# Patient Record
Sex: FEMALE | Race: WHITE | Employment: UNEMPLOYED
[De-identification: names, ages, dates, MRNs, and addresses within clinical notes are randomized per-mention and may not be internally consistent; named-entity substitution may affect disease eponyms.]

---

## 2021-07-16 ENCOUNTER — HOSPITAL ENCOUNTER (OUTPATIENT)
Facility: MEDICAL CENTER | Age: 21
End: 2021-07-16

## 2021-07-19 ENCOUNTER — HOSPITAL ENCOUNTER (OUTPATIENT)
Age: 21
Setting detail: SPECIMEN
Discharge: HOME | End: 2021-07-19
Payer: MEDICAID

## 2021-07-19 ENCOUNTER — OFFICE VISIT (OUTPATIENT)
Dept: OBGYN CLINIC | Age: 21
End: 2021-07-19
Payer: MEDICAID

## 2021-07-19 VITALS
BODY MASS INDEX: 49.78 KG/M2 | HEART RATE: 106 BPM | HEIGHT: 62 IN | WEIGHT: 270.5 LBS | SYSTOLIC BLOOD PRESSURE: 115 MMHG | DIASTOLIC BLOOD PRESSURE: 73 MMHG

## 2021-07-19 DIAGNOSIS — N93.9 ABNORMAL UTERINE BLEEDING (AUB): Primary | ICD-10-CM

## 2021-07-19 DIAGNOSIS — N93.9 ABNORMAL UTERINE BLEEDING (AUB): ICD-10-CM

## 2021-07-19 DIAGNOSIS — N92.1 MENORRHAGIA WITH IRREGULAR CYCLE: ICD-10-CM

## 2021-07-19 LAB
ABSOLUTE EOS #: 0.32 K/UL (ref 0–0.44)
ABSOLUTE IMMATURE GRANULOCYTE: 0.11 K/UL (ref 0–0.3)
ABSOLUTE LYMPH #: 2.05 K/UL (ref 1.2–5.2)
ABSOLUTE MONO #: 1.4 K/UL (ref 0.1–1.4)
BASOPHILS # BLD: 1 % (ref 0–2)
BASOPHILS ABSOLUTE: 0.11 K/UL (ref 0–0.2)
CHOLESTEROL/HDL RATIO: 2.5
CHOLESTEROL: 114 MG/DL
DIFFERENTIAL TYPE: ABNORMAL
EOSINOPHILS RELATIVE PERCENT: 3 % (ref 1–4)
ESTIMATED AVERAGE GLUCOSE: 68 MG/DL
ESTRADIOL LEVEL: 171 PG/ML (ref 27–314)
FOLLICLE STIMULATING HORMONE: 4.1 U/L (ref 1.7–21.5)
HBA1C MFR BLD: 4 % (ref 4–6)
HCT VFR BLD CALC: 39.3 % (ref 36.3–47.1)
HDLC SERPL-MCNC: 46 MG/DL
HEMOGLOBIN: 10.8 G/DL (ref 11.9–15.1)
IMMATURE GRANULOCYTES: 1 %
LDL CHOLESTEROL: 56 MG/DL (ref 0–130)
LYMPHOCYTES # BLD: 19 % (ref 25–45)
MCH RBC QN AUTO: 21.9 PG (ref 25.2–33.5)
MCHC RBC AUTO-ENTMCNC: 27.5 G/DL (ref 28.4–34.8)
MCV RBC AUTO: 79.7 FL (ref 82.6–102.9)
MONOCYTES # BLD: 13 % (ref 2–8)
MORPHOLOGY: ABNORMAL
NRBC AUTOMATED: 0 PER 100 WBC
PDW BLD-RTO: 21.8 % (ref 11.8–14.4)
PLATELET # BLD: ABNORMAL K/UL (ref 138–453)
PLATELET ESTIMATE: ABNORMAL
PLATELET, FLUORESCENCE: 478 K/UL (ref 138–453)
PLATELET, IMMATURE FRACTION: 3.2 % (ref 1.1–10.3)
PMV BLD AUTO: ABNORMAL FL (ref 8.1–13.5)
PROLACTIN: 24.53 UG/L (ref 4.79–23.3)
RBC # BLD: 4.93 M/UL (ref 3.95–5.11)
RBC # BLD: ABNORMAL 10*6/UL
SEG NEUTROPHILS: 63 % (ref 34–64)
SEGMENTED NEUTROPHILS ABSOLUTE COUNT: 6.81 K/UL (ref 1.8–8)
SEX HORMONE BINDING GLOBULIN: 13 NMOL/L (ref 30–135)
TESTOSTERONE FREE-NONMALE: 6.6 PG/ML (ref 0.8–7.4)
TESTOSTERONE TOTAL: 24 NG/DL (ref 20–70)
TRIGL SERPL-MCNC: 60 MG/DL
TSH SERPL DL<=0.05 MIU/L-ACNC: 0.81 MIU/L (ref 0.3–5)
VLDLC SERPL CALC-MCNC: NORMAL MG/DL (ref 1–30)
WBC # BLD: 10.8 K/UL (ref 4.5–13.5)
WBC # BLD: ABNORMAL 10*3/UL

## 2021-07-19 PROCEDURE — G8417 CALC BMI ABV UP PARAM F/U: HCPCS | Performed by: OBSTETRICS & GYNECOLOGY

## 2021-07-19 PROCEDURE — 99203 OFFICE O/P NEW LOW 30 MIN: CPT | Performed by: OBSTETRICS & GYNECOLOGY

## 2021-07-19 PROCEDURE — G8427 DOCREV CUR MEDS BY ELIG CLIN: HCPCS | Performed by: OBSTETRICS & GYNECOLOGY

## 2021-07-19 PROCEDURE — 1036F TOBACCO NON-USER: CPT | Performed by: OBSTETRICS & GYNECOLOGY

## 2021-07-19 RX ORDER — OMEPRAZOLE 40 MG/1
CAPSULE, DELAYED RELEASE ORAL
COMMUNITY
Start: 2021-07-01

## 2021-07-19 RX ORDER — DROSPIRENONE AND ETHINYL ESTRADIOL 0.03MG-3MG
1 KIT ORAL DAILY
Qty: 1 PACKET | Refills: 3 | Status: SHIPPED | OUTPATIENT
Start: 2021-07-19 | End: 2021-11-09 | Stop reason: SDUPTHER

## 2021-07-19 ASSESSMENT — ENCOUNTER SYMPTOMS
COUGH: 0
SHORTNESS OF BREATH: 0
ABDOMINAL PAIN: 0
BACK PAIN: 0

## 2021-07-19 NOTE — PROGRESS NOTES
[facility]  [facility] - [address] [facility]  Dept: [phone]  Dept Fax: [phone]    22    Chief Complaint   Patient presents with    New Patient    Menorrhagia     Pt states she is having heavy irregular periods        Saleem Rosario 22 y.o. has a complaint of heavy irregular periods, and is here to establish care. Her periods are so heavy that she was given blood in the hospital for her Hb which she says was 4 and that happened last month. She went to [facility] and [facility] where they gave her 3 units. She was having a lot of fatigue and SOB. She says that her Hb was up to 9.5 about 3 weeks ago. She has been taking iron daily for it. She sometimes doesn't have a period every month. She has an appointment with a hematologist next week. She has gone at most about 3-4 months without a period. She had menarche at age 6. She says her periods have never been regular. She is not sexually active and has never been. She says her oldest sister has PCOS. She does have some increased hair under her chin. She does have some acne occasionally. Review of Systems   Constitutional: Negative for chills and fever. HENT: Negative for congestion. Respiratory: Negative for cough and shortness of breath. Cardiovascular: Negative for chest pain and palpitations. Gastrointestinal: Negative for abdominal pain. Genitourinary: Positive for menstrual problem. Negative for dyspareunia, pelvic pain and vaginal discharge. Musculoskeletal: Negative for back pain. Neurological: Negative for dizziness and light-headedness. Psychiatric/Behavioral: The patient is not nervous/anxious. Gynecologic History  Patient's last menstrual period was 2021 (approximate). Contraception: abstinence  Last Pap: n/a  Last Mammogram: n/a    Obstetric History  : 0  Para: 0  AB: 0    History reviewed. No pertinent past medical history.   Past Surgical History:   Procedure Laterality Date    TYMPANOSTOMY TUBE PLACEMENT      WISDOM TOOTH EXTRACTION       No Known Allergies  Current Outpatient Medications   Medication Sig Dispense Refill    omeprazole (PRILOSEC) 40 MG delayed release capsule take 1 capsule by mouth once daily      drospirenone-ethinyl estradiol (RENEE 28) 3-0.03 MG TABS Take 1 tablet by mouth daily 1 packet 3     No current facility-administered medications for this visit. Social History     Socioeconomic History    Marital status: Unknown     Spouse name: Not on file    Number of children: Not on file    Years of education: Not on file    Highest education level: Not on file   Occupational History    Not on file   Tobacco Use    Smoking status: Never Smoker    Smokeless tobacco: Never Used   Vaping Use    Vaping Use: Never used   Substance and Sexual Activity    Alcohol use: Yes     Comment: rare    Drug use: Never    Sexual activity: Not on file   Other Topics Concern    Not on file   Social History Narrative    Not on file     Social Determinants of Health     Financial Resource Strain:     Difficulty of Paying Living Expenses:    Food Insecurity:     Worried About Running Out of Food in the Last Year:     [address] in the Last Year:    Transportation Needs:     Lack of Transportation (Medical):  Lack of Transportation (Non-Medical):    Physical Activity:     Days of Exercise per Week:     Minutes of Exercise per Session:    Stress:     Feeling of Stress :    Social Connections:     Frequency of Communication with Friends and Family:     Frequency of Social Gatherings with Friends and Family:     Attends Mormonism Services:     Active Member of Clubs or Organizations:     Attends Club or Organization Meetings:     Marital Status:    Intimate Partner Violence:     Fear of Current or Ex-Partner:     Emotionally Abused:     Physically Abused:     Sexually Abused:      History reviewed.  No pertinent family history. Physical exam Physical Exam  Constitutional:       Appearance: Normal appearance. She is obese. HENT:      Head: Normocephalic. Eyes:      Extraocular Movements: Extraocular movements intact. Pulmonary:      Effort: Pulmonary effort is normal.   Neurological:      Mental Status: She is alert and oriented to person, place, and time. Psychiatric:         Mood and Affect: Mood normal.         Behavior: Behavior normal.         Thought Content: Thought content normal.         Judgment: Judgment normal.         Assessment/Plan  1. Abnormal uterine bleeding (AUB)  - Suspect PCOS with family history and the fact that she has irregular periods. Will order labs and TVUS to confirm if metabolic in nature or PCOS. - Discussed diagnosis of PCOS based on oligo/amenorrhea, ultrasound findings and hyperandrogenism. Reviewed implications of the disease including increased risk for insulin resistance/DM, lipid abnormalities and cardiovascular disease, as well as GYN specific effects (possible infertility, oligomenorrhea, increased risk of endometrial cancer). Encouraged weight loss with diet and exercise. Reviewed medical management options for endometrial protection, including side effect profiles and dosing regimens. All questions answered. Patient desires to proceed with OCPs at this time. She has no contraindications to combination birth control. Discussed how and when to start the medication and how to take it appropriately. All questions answered. - Follicle Stimulating Hormone; Future  - Hemoglobin A1C; Future  - Prolactin; Future  - Lipid Panel; Future  - 17-Hydroxyprogesterone; Future  - Testosterone, Free; Future  - TSH WITH REFLEX TO FT4; Future  - US NON OB TRANSVAGINAL; Future  - US PELVIS COMPLETE; Future  - Estradiol; Future    2.  Menorrhagia with irregular cycle  - Required blood transfusion  - CBC Auto Differential; Future  - on ferrous sulfate BID  - Will start on OCPs

## 2021-07-20 DIAGNOSIS — R79.89 ELEVATED PROLACTIN LEVEL: Primary | ICD-10-CM

## 2021-07-22 ENCOUNTER — HOSPITAL ENCOUNTER (OUTPATIENT)
Dept: ULTRASOUND IMAGING | Facility: CLINIC | Age: 21
Discharge: HOME | End: 2021-07-24
Payer: MEDICAID

## 2021-07-22 DIAGNOSIS — N93.9 ABNORMAL UTERINE BLEEDING (AUB): ICD-10-CM

## 2021-07-22 PROBLEM — E28.2 PCOS (POLYCYSTIC OVARIAN SYNDROME): Status: ACTIVE | Noted: 2021-07-22

## 2021-07-22 PROCEDURE — 76856 US EXAM PELVIC COMPLETE: CPT

## 2021-07-23 LAB — 17 OH PROGESTERONE: 80.38 NG/DL

## 2021-07-26 ENCOUNTER — TELEPHONE (OUTPATIENT)
Dept: ONCOLOGY | Age: 21
End: 2021-07-26

## 2021-07-26 ENCOUNTER — INITIAL CONSULT (OUTPATIENT)
Dept: ONCOLOGY | Age: 21
End: 2021-07-26
Payer: MEDICAID

## 2021-07-26 VITALS
WEIGHT: 268.7 LBS | BODY MASS INDEX: 49.15 KG/M2 | SYSTOLIC BLOOD PRESSURE: 119 MMHG | HEART RATE: 94 BPM | DIASTOLIC BLOOD PRESSURE: 75 MMHG | TEMPERATURE: 98.1 F | RESPIRATION RATE: 16 BRPM

## 2021-07-26 DIAGNOSIS — D50.0 IRON DEFICIENCY ANEMIA DUE TO CHRONIC BLOOD LOSS: ICD-10-CM

## 2021-07-26 DIAGNOSIS — N92.1 MENORRHAGIA WITH IRREGULAR CYCLE: Primary | ICD-10-CM

## 2021-07-26 PROCEDURE — G8427 DOCREV CUR MEDS BY ELIG CLIN: HCPCS | Performed by: INTERNAL MEDICINE

## 2021-07-26 PROCEDURE — G8417 CALC BMI ABV UP PARAM F/U: HCPCS | Performed by: INTERNAL MEDICINE

## 2021-07-26 PROCEDURE — 99202 OFFICE O/P NEW SF 15 MIN: CPT

## 2021-07-26 PROCEDURE — 99204 OFFICE O/P NEW MOD 45 MIN: CPT | Performed by: INTERNAL MEDICINE

## 2021-07-26 RX ORDER — FERROUS SULFATE 325(65) MG
325 TABLET ORAL 2 TIMES DAILY
COMMUNITY

## 2021-07-26 RX ORDER — MULTIVIT WITH MINERALS/LUTEIN
250 TABLET ORAL DAILY
COMMUNITY

## 2021-07-26 NOTE — PROGRESS NOTES
DIAGNOSIS:   1. Iron deficiency anemia  2. PCOS  3. Menometrorrhagia  CURRENT THERAPY:  Received blood transfusion and IV iron in the hospital  Oral iron  BRIEF CASE HISTORY:   Meryle Sines is a very pleasant 22 y.o. female who is referred to us for iron deficiency anemia. She has history of PCOS and menometrorrhagia, she presented with dizziness and lightheadedness. She was found to be severely anemic requiring transfusion. Patient was discharged from [facility] with an appointment to follow-up with us and with GI. She is sent to us for a consultation, she is feeling a little stronger. She is taking oral iron twice a day. She is going through a lot of stress due to family and social issues. She denies any active bleeding otherwise. She denies any PICA symptoms    PAST MEDICAL HISTORY: has a past medical history of PCOS (polycystic ovarian syndrome). PAST SURGICAL HISTORY: has a past surgical history that includes Tympanostomy tube placement and [place] tooth extraction. CURRENT MEDICATIONS:  has a current medication list which includes the following prescription(s): ferrous sulfate, vitamin c, omeprazole, and drospirenone-ethinyl estradiol. ALLERGIES:  has No Known Allergies. FAMILY HISTORY: Negative for any hematological or oncological conditions. SOCIAL HISTORY:  reports that she has never smoked. She has never used smokeless tobacco. She reports current alcohol use. She reports that she does not use drugs. REVIEW OF SYSTEMS:   General: no fever or night sweats, Weight is stable. Fatigue is improving. ENT: No double or blurred vision, no tinnitus or hearing problem, no dysphagia or sore throat   Respiratory: No chest pain, no shortness of breath, no cough or hemoptysis. Cardiovascular: Denies chest pain, PND or orthopnea. No L E swelling or palpitations. Gastrointestinal:    No nausea or vomiting, abdominal pain, diarrhea or constipation.      Genitourinary: Denies dysuria, hematuria, frequency, urgency or incontinence. Menometrorrhagia   Neurological: Denies headaches, decreased LOC, no sensory or motor focal deficits. Musculoskeletal:  No arthralgia no back pain or joint swelling. Skin: There are no rashes or bleeding. Psychiatric:  No anxiety, no depression. Endocrine: no diabetes or thyroid disease. Hematologic: no bleeding , no adenopathy. PHYSICAL EXAM: Shows a well appearing 22y.o.-year-old female who is not in pain or distress. She is morbidly obese with signs of hirsutism likely related to PCOS vital Signs: Blood pressure 119/75, pulse 94, temperature 98.1 °F (36.7 °C), temperature source Temporal, resp. rate 16, weight 268 lb 11.2 oz (121.9 kg), last menstrual period 06/22/2021. HEENT: Normocephalic and atraumatic. Pupils are equal, round, reactive to light and accommodation. Extraocular muscles are intact. Neck: Showed no JVD, no carotid bruit . Lungs: Clear to auscultation bilaterally. Heart: Regular without any murmur. Abdomen: Soft, nontender. No hepatosplenomegaly. Extremities: Lower extremities show no edema, clubbing, or cyanosis. Breasts: Examination not done today. Neuro exam: intact cranial nerves bilaterally no motor or sensory deficit, gait is normal. Lymphatic: no adenopathy appreciated in the supraclavicular, axillary, cervical or inguinal area    REVIEW OF LABORATORY DATA:   Lab Results   Component Value Date    WBC 10.8 07/19/2021    HGB 10.8 (L) 07/19/2021    HCT 39.3 07/19/2021    MCV 79.7 (L) 07/19/2021    PLT See Reflexed IPF Result 07/19/2021       REVIEW OF RADIOLOGICAL RESULTS:     IMPRESSION:   1. Menometrorrhagia   2. iron deficiency anemia secondary to blood loss  3. Responding to IV iron and oral iron replacement  PLAN:   The patient is doing well on oral iron, will continue current therapy  Will definitely need GI work-up  Thrombocytosis is secondary to iron deficiency  We will see her back in 3 months for a checkup.   We will follow up on the results of endoscopic evaluation  The patient will need management for her PCOS and menometrorrhagia

## 2021-08-14 ENCOUNTER — HOSPITAL ENCOUNTER (OUTPATIENT)
Age: 21
Discharge: HOME | End: 2021-08-14
Payer: MEDICAID

## 2021-08-14 LAB
PROLACTIN: 16.82 UG/L (ref 4.79–23.3)
TSH SERPL DL<=0.05 MIU/L-ACNC: 1.26 MIU/L (ref 0.3–5)

## 2021-08-14 PROCEDURE — 36415 COLL VENOUS BLD VENIPUNCTURE: CPT

## 2021-08-14 PROCEDURE — 84443 ASSAY THYROID STIM HORMONE: CPT

## 2021-08-14 PROCEDURE — 84146 ASSAY OF PROLACTIN: CPT

## 2021-10-15 ENCOUNTER — HOSPITAL ENCOUNTER (OUTPATIENT)
Age: 21
Setting detail: SPECIMEN
Discharge: HOME | End: 2021-10-15
Payer: MEDICAID

## 2021-10-15 DIAGNOSIS — D50.0 IRON DEFICIENCY ANEMIA DUE TO CHRONIC BLOOD LOSS: ICD-10-CM

## 2021-10-15 DIAGNOSIS — N92.1 MENORRHAGIA WITH IRREGULAR CYCLE: ICD-10-CM

## 2021-10-15 LAB
ABSOLUTE EOS #: 0.13 K/UL (ref 0–0.4)
ABSOLUTE IMMATURE GRANULOCYTE: 0 K/UL (ref 0–0.3)
ABSOLUTE LYMPH #: 1.88 K/UL (ref 1.2–5.2)
ABSOLUTE MONO #: 1.5 K/UL (ref 0.1–1.4)
BASOPHILS # BLD: 0 % (ref 0–2)
BASOPHILS ABSOLUTE: 0 K/UL (ref 0–0.2)
DIFFERENTIAL TYPE: ABNORMAL
EOSINOPHILS RELATIVE PERCENT: 1 % (ref 1–4)
FERRITIN: 20 UG/L (ref 13–150)
HCT VFR BLD CALC: 41.2 % (ref 36.3–47.1)
HEMOGLOBIN: 11.2 G/DL (ref 11.9–15.1)
IMMATURE GRANULOCYTES: 0 %
IRON SATURATION: 9 % (ref 20–55)
IRON: 25 UG/DL (ref 37–145)
LYMPHOCYTES # BLD: 15 % (ref 25–45)
MCH RBC QN AUTO: 21.7 PG (ref 25.2–33.5)
MCHC RBC AUTO-ENTMCNC: 27.2 G/DL (ref 28.4–34.8)
MCV RBC AUTO: 79.8 FL (ref 82.6–102.9)
MONOCYTES # BLD: 12 % (ref 2–8)
MORPHOLOGY: ABNORMAL
MORPHOLOGY: ABNORMAL
NRBC AUTOMATED: 0 PER 100 WBC
PDW BLD-RTO: 14.9 % (ref 11.8–14.4)
PLATELET # BLD: 548 K/UL (ref 138–453)
PLATELET ESTIMATE: ABNORMAL
PMV BLD AUTO: 10.3 FL (ref 8.1–13.5)
RBC # BLD: 5.16 M/UL (ref 3.95–5.11)
RBC # BLD: ABNORMAL 10*6/UL
SEG NEUTROPHILS: 72 % (ref 34–64)
SEGMENTED NEUTROPHILS ABSOLUTE COUNT: 8.99 K/UL (ref 1.8–8)
TOTAL IRON BINDING CAPACITY: 285 UG/DL (ref 250–450)
UNSATURATED IRON BINDING CAPACITY: 260 UG/DL (ref 112–347)
WBC # BLD: 12.5 K/UL (ref 4.5–13.5)
WBC # BLD: ABNORMAL 10*3/UL

## 2021-10-19 ENCOUNTER — HOSPITAL ENCOUNTER (OUTPATIENT)
Facility: MEDICAL CENTER | Age: 21
End: 2021-10-19
Payer: MEDICAID

## 2021-10-25 ENCOUNTER — TELEPHONE (OUTPATIENT)
Dept: ONCOLOGY | Age: 21
End: 2021-10-25

## 2021-10-25 ENCOUNTER — OFFICE VISIT (OUTPATIENT)
Dept: ONCOLOGY | Age: 21
End: 2021-10-25
Payer: MEDICAID

## 2021-10-25 VITALS — WEIGHT: 265.2 LBS | TEMPERATURE: 97.6 F | BODY MASS INDEX: 48.51 KG/M2

## 2021-10-25 DIAGNOSIS — N92.1 MENORRHAGIA WITH IRREGULAR CYCLE: Primary | ICD-10-CM

## 2021-10-25 DIAGNOSIS — D50.0 IRON DEFICIENCY ANEMIA DUE TO CHRONIC BLOOD LOSS: ICD-10-CM

## 2021-10-25 PROCEDURE — 99214 OFFICE O/P EST MOD 30 MIN: CPT | Performed by: INTERNAL MEDICINE

## 2021-10-25 PROCEDURE — 1036F TOBACCO NON-USER: CPT | Performed by: INTERNAL MEDICINE

## 2021-10-25 PROCEDURE — G8427 DOCREV CUR MEDS BY ELIG CLIN: HCPCS | Performed by: INTERNAL MEDICINE

## 2021-10-25 PROCEDURE — 99211 OFF/OP EST MAY X REQ PHY/QHP: CPT

## 2021-10-25 PROCEDURE — G8484 FLU IMMUNIZE NO ADMIN: HCPCS | Performed by: INTERNAL MEDICINE

## 2021-10-25 PROCEDURE — G8417 CALC BMI ABV UP PARAM F/U: HCPCS | Performed by: INTERNAL MEDICINE

## 2021-10-25 NOTE — PROGRESS NOTES
DIAGNOSIS:   1. Iron deficiency anemia  2. PCOS  3. Menometrorrhagia  CURRENT THERAPY:  Received blood transfusion and IV iron in the hospital  Oral iron  BRIEF CASE HISTORY:   Leah Mendiola is a very pleasant 22 y.o. female who is referred to us for iron deficiency anemia. She has history of PCOS and menometrorrhagia, she presented with dizziness and lightheadedness. She was found to be severely anemic requiring transfusion. Patient was discharged from [facility] with an appointment to follow-up with us and with GI. She is sent to us for a consultation, she is feeling a little stronger. She is taking oral iron twice a day. She is going through a lot of stress due to family and social issues. She denies any active bleeding otherwise. She denies any PICA symptoms  INTERIM HISTORY]  Patient comes in today for a follow-up, she is feeling a little better. Her periods are well controlled with the use of OCP  She is feeling stronger. She has no energy problems. She  decreased oral iron to once every other day. PAST MEDICAL HISTORY: has a past medical history of PCOS (polycystic ovarian syndrome). PAST SURGICAL HISTORY: has a past surgical history that includes Tympanostomy tube placement and [place] tooth extraction. CURRENT MEDICATIONS:  has a current medication list which includes the following prescription(s): prenatal vit-fe fumarate-fa, ferrous sulfate, vitamin c, omeprazole, and drospirenone-ethinyl estradiol. ALLERGIES:  has No Known Allergies. FAMILY HISTORY: Negative for any hematological or oncological conditions. SOCIAL HISTORY:  reports that she has never smoked. She has never used smokeless tobacco. She reports current alcohol use. She reports that she does not use drugs. REVIEW OF SYSTEMS:   General: no fever or night sweats, Weight is stable. Fatigue is improving.   ENT: No double or blurred vision, no tinnitus or hearing problem, no dysphagia or sore throat Respiratory: No chest pain, no shortness of breath, no cough or hemoptysis. Cardiovascular: Denies chest pain, PND or orthopnea. No L E swelling or palpitations. Gastrointestinal:    No nausea or vomiting, abdominal pain, diarrhea or constipation. Genitourinary: Denies dysuria, hematuria, frequency, urgency or incontinence. Menometrorrhagia   Neurological: Denies headaches, decreased LOC, no sensory or motor focal deficits. Musculoskeletal:  No arthralgia no back pain or joint swelling. Skin: There are no rashes or bleeding. Psychiatric:  No anxiety, no depression. Endocrine: no diabetes or thyroid disease. Hematologic: no bleeding , no adenopathy. PHYSICAL EXAM: Shows a well appearing 22y.o.-year-old female who is not in pain or distress. She is morbidly obese with signs of hirsutism likely related to PCOS vital Signs: Temperature 97.6 °F (36.4 °C), temperature source Temporal, weight 265 lb 3.2 oz (120.3 kg). HEENT: Normocephalic and atraumatic. Pupils are equal, round, reactive to light and accommodation. Extraocular muscles are intact. Neck: Showed no JVD, no carotid bruit . Lungs: Clear to auscultation bilaterally. Heart: Regular without any murmur. Abdomen: Soft, nontender. No hepatosplenomegaly. Extremities: Lower extremities show no edema, clubbing, or cyanosis. Breasts: Examination not done today. Neuro exam: intact cranial nerves bilaterally no motor or sensory deficit, gait is normal. Lymphatic: no adenopathy appreciated in the supraclavicular, axillary, cervical or inguinal area    REVIEW OF LABORATORY DATA:   Lab Results   Component Value Date    WBC 12.5 10/15/2021    HGB 11.2 (L) 10/15/2021    HCT 41.2 10/15/2021    MCV 79.8 (L) 10/15/2021     (H) 10/15/2021     Lab Results   Component Value Date    IRON 25 (L) 10/15/2021    TIBC 285 10/15/2021    FERRITIN 20 10/15/2021       REVIEW OF RADIOLOGICAL RESULTS:     IMPRESSION:   1. Menometrorrhagia   2.  iron deficiency anemia secondary to blood loss  3. Responding to IV iron and oral iron replacement  PLAN:   The patient is doing well on oral iron, will continue current therapy  Despite extensive replacement, her ferritin is still low. Hemoglobin has improved  She is not actively bleeding at this point so expect her ferritin and iron to continue to improve  We will recheck in 4 to 6 months, if the numbers do not improve by next visit, further work-up is needed.   I still believe she will benefit from GI work-up

## 2021-10-25 NOTE — TELEPHONE ENCOUNTER
JOHNY HERE FOR MD VISIT  RV IN 6 MTHS  NEED CBC FERRITIN PRIOR TO RV  LABS CDP FERRITIN ORDERS GIVEN TO PT ON EXIT TO BE DONE 1 WK PRIOR TO RV  MD VISIT 4/25/22 @ 11:15AM  AVS PRINTED W/ INSTRUCTIONS AND GIVEN TO PT ON EXIT

## 2021-11-09 ENCOUNTER — TELEPHONE (OUTPATIENT)
Dept: OBGYN CLINIC | Age: 21
End: 2021-11-09

## 2021-11-09 RX ORDER — DROSPIRENONE AND ETHINYL ESTRADIOL 0.03MG-3MG
1 KIT ORAL DAILY
Qty: 1 PACKET | Refills: 0 | Status: SHIPPED | OUTPATIENT
Start: 2021-11-09 | End: 2021-11-22 | Stop reason: SDUPTHER

## 2021-11-09 NOTE — TELEPHONE ENCOUNTER
23 y/o Pt calling for refills of her Latonia 28 (3-0.03 MG). Pt states she is doing really well on this pill and is now regular. Other findings:  -07/19/21 NP w/ for AUB  -scheduled for F/U on 11/22/21    Pharmacy:  [facility]/Sunday, [address] [facility]:  -will route her request to  for additional refills  -will confirm if she needs to return for her visit on 11/22/21    Pt will wait for a call back from office some time today.

## 2021-11-22 ENCOUNTER — OFFICE VISIT (OUTPATIENT)
Dept: OBGYN CLINIC | Age: 21
End: 2021-11-22
Payer: MEDICAID

## 2021-11-22 VITALS
HEART RATE: 99 BPM | HEIGHT: 62 IN | SYSTOLIC BLOOD PRESSURE: 146 MMHG | DIASTOLIC BLOOD PRESSURE: 85 MMHG | WEIGHT: 268.6 LBS | BODY MASS INDEX: 49.43 KG/M2

## 2021-11-22 DIAGNOSIS — E28.2 PCOS (POLYCYSTIC OVARIAN SYNDROME): Primary | ICD-10-CM

## 2021-11-22 DIAGNOSIS — Z30.41 ENCOUNTER FOR SURVEILLANCE OF CONTRACEPTIVE PILLS: ICD-10-CM

## 2021-11-22 PROCEDURE — 1036F TOBACCO NON-USER: CPT | Performed by: OBSTETRICS & GYNECOLOGY

## 2021-11-22 PROCEDURE — G8484 FLU IMMUNIZE NO ADMIN: HCPCS | Performed by: OBSTETRICS & GYNECOLOGY

## 2021-11-22 PROCEDURE — G8417 CALC BMI ABV UP PARAM F/U: HCPCS | Performed by: OBSTETRICS & GYNECOLOGY

## 2021-11-22 PROCEDURE — G8427 DOCREV CUR MEDS BY ELIG CLIN: HCPCS | Performed by: OBSTETRICS & GYNECOLOGY

## 2021-11-22 PROCEDURE — 99213 OFFICE O/P EST LOW 20 MIN: CPT | Performed by: OBSTETRICS & GYNECOLOGY

## 2021-11-22 RX ORDER — DROSPIRENONE AND ETHINYL ESTRADIOL 0.03MG-3MG
1 KIT ORAL DAILY
Qty: 1 PACKET | Refills: 12 | Status: SHIPPED | OUTPATIENT
Start: 2021-11-22 | End: 2022-03-15 | Stop reason: SINTOL

## 2021-11-22 ASSESSMENT — ENCOUNTER SYMPTOMS
ABDOMINAL PAIN: 0
BACK PAIN: 0
SHORTNESS OF BREATH: 0
COUGH: 0

## 2021-11-22 NOTE — PROGRESS NOTES
[facility]  [facility] - [address] [facility]  Dept: [phone]  Dept Fax: [phone]    22    Chief Complaint   Patient presents with    Follow-up       [address] [facility] 22 y.o. was diagnosed with PCOS and was started on OCPs 4 months ago to help regulate her periods. She is doing well with the pills and is not having any issues. She says she is having a period every month and they last 4-5 days. She likes them and would like to continue on them. She is thinking she might want to try one of the menstrual cups instead of pads and wanted to make sure that was ok. Review of Systems   Constitutional: Negative for chills and fever. HENT: Negative for congestion. Respiratory: Negative for cough and shortness of breath. Cardiovascular: Negative for chest pain and palpitations. Gastrointestinal: Negative for abdominal pain. Genitourinary: Positive for menstrual problem. Negative for vaginal discharge. Musculoskeletal: Negative for back pain. Neurological: Negative for dizziness and light-headedness. Psychiatric/Behavioral: The patient is not nervous/anxious. Gynecologic History  Patient's last menstrual period was 11/10/2021.   Contraception: abstinence  Last Pap: n/a    Obstetric History  : 0  Para: 0  AB: 0    Past Medical History:   Diagnosis Date    PCOS (polycystic ovarian syndrome) 2021     Past Surgical History:   Procedure Laterality Date    TYMPANOSTOMY TUBE PLACEMENT      WISDOM TOOTH EXTRACTION       No Known Allergies  Current Outpatient Medications   Medication Sig Dispense Refill    drospirenone-ethinyl estradiol (RENEE 28) 3-0.03 MG TABS Take 1 tablet by mouth daily 1 packet 12    Prenatal Vit-Fe Fumarate-FA (PRENATAL VITAMINS PO) Take 1 tablet by mouth daily      ferrous sulfate (IRON 325) 325 (65 Fe) MG tablet Take 325 mg by mouth 2 times daily OTC / taking one tablet every other day  Ascorbic Acid (VITAMIN C) 250 MG tablet Take 250 mg by mouth daily      omeprazole (PRILOSEC) 40 MG delayed release capsule take 1 capsule by mouth once daily       No current facility-administered medications for this visit. Social History     Socioeconomic History    Marital status: Single     Spouse name: Not on file    Number of children: Not on file    Years of education: Not on file    Highest education level: Not on file   Occupational History    Not on file   Tobacco Use    Smoking status: Never Smoker    Smokeless tobacco: Never Used   Vaping Use    Vaping Use: Never used   Substance and Sexual Activity    Alcohol use: Yes     Comment: rare    Drug use: Never    Sexual activity: Not on file   Other Topics Concern    Not on file   Social History Narrative    Not on file     Social Determinants of Health     Financial Resource Strain:     Difficulty of Paying Living Expenses: Not on file   Food Insecurity:     Worried About Running Out of Food in the Last Year: Not on file    [place]oxana of Food in the Last Year: Not on file   Transportation Needs:     Lack of Transportation (Medical): Not on file    Lack of Transportation (Non-Medical):  Not on file   Physical Activity:     Days of Exercise per Week: Not on file    Minutes of Exercise per Session: Not on file   Stress:     Feeling of Stress : Not on file   Social Connections:     Frequency of Communication with Friends and Family: Not on file    Frequency of Social Gatherings with Friends and Family: Not on file    Attends Zoroastrianism Services: Not on file    Active Member of Clubs or Organizations: Not on file    Attends Club or Organization Meetings: Not on file    Marital Status: Not on file   Intimate Partner Violence:     Fear of Current or Ex-Partner: Not on file    Emotionally Abused: Not on file    Physically Abused: Not on file    Sexually Abused: Not on file   Housing Stability:     Unable to Pay for Housing in the Last Year: Not on file    Number of Places Lived in the Last Year: Not on file    Unstable Housing in the Last Year: Not on file     Family History   Problem Relation Age of Onset    Diabetes Father     Diabetes Brother     Heart Attack Paternal Grandmother     Heart Disease Paternal Grandfather     Heart Attack Paternal Grandfather        Physical exam Physical Exam  Constitutional:       Appearance: Normal appearance. She is obese. HENT:      Head: Normocephalic. Eyes:      Extraocular Movements: Extraocular movements intact. Pulmonary:      Effort: Pulmonary effort is normal.   Neurological:      Mental Status: She is alert and oriented to person, place, and time. Psychiatric:         Mood and Affect: Mood normal.         Behavior: Behavior normal.         Thought Content: Thought content normal.         Judgment: Judgment normal.         Assessment/Plan  1. PCOS (polycystic ovarian syndrome)  Discussed diagnosis of PCOS based on oligo/amenorrhea, ultrasound findings and hyperandrogenism. Reviewed implications of the disease including increased risk for insulin resistance/DM, lipid abnormalities and cardiovascular disease, as well as GYN specific effects (possible infertility, oligomenorrhea, increased risk of endometrial cancer). Encouraged weight loss with diet and exercise. Reviewed medical management options for endometrial protection, including side effect profiles and dosing regimens. All questions answered. Patient desires to proceed with OCPs at this time.      2. Encounter for surveillance of contraceptive pills  - Doing well on her OCPs and she would like to continue on them    Pt to follow up in 1 year  Donaldo Diamond MD  [address]

## 2022-03-15 RX ORDER — NORGESTIMATE AND ETHINYL ESTRADIOL 0.25-0.035
1 KIT ORAL DAILY
Qty: 1 PACKET | Refills: 12 | Status: SHIPPED | OUTPATIENT
Start: 2022-03-15

## 2022-04-18 ENCOUNTER — HOSPITAL ENCOUNTER (OUTPATIENT)
Facility: MEDICAL CENTER | Age: 22
End: 2022-04-18
Payer: COMMERCIAL

## 2022-04-19 ENCOUNTER — HOSPITAL ENCOUNTER (OUTPATIENT)
Age: 22
Setting detail: SPECIMEN
Discharge: HOME | End: 2022-04-19

## 2022-04-19 DIAGNOSIS — D50.0 IRON DEFICIENCY ANEMIA DUE TO CHRONIC BLOOD LOSS: ICD-10-CM

## 2022-04-19 DIAGNOSIS — N92.1 MENORRHAGIA WITH IRREGULAR CYCLE: ICD-10-CM

## 2022-04-19 LAB
ABSOLUTE EOS #: 0.25 K/UL (ref 0–0.44)
ABSOLUTE IMMATURE GRANULOCYTE: 0.05 K/UL (ref 0–0.3)
ABSOLUTE LYMPH #: 2.12 K/UL (ref 1.1–3.7)
ABSOLUTE MONO #: 1.42 K/UL (ref 0.1–1.4)
BASOPHILS # BLD: 1 % (ref 0–2)
BASOPHILS ABSOLUTE: 0.08 K/UL (ref 0–0.2)
EOSINOPHILS RELATIVE PERCENT: 2 % (ref 1–4)
FERRITIN: 27 NG/ML (ref 13–150)
HCT VFR BLD CALC: 42.1 % (ref 36.3–47.1)
HEMOGLOBIN: 12.3 G/DL (ref 11.9–15.1)
IMMATURE GRANULOCYTES: 1 %
LYMPHOCYTES # BLD: 20 % (ref 25–45)
MCH RBC QN AUTO: 24.4 PG (ref 25.2–33.5)
MCHC RBC AUTO-ENTMCNC: 29.2 G/DL (ref 28.4–34.8)
MCV RBC AUTO: 83.4 FL (ref 82.6–102.9)
MONOCYTES # BLD: 13 % (ref 2–8)
NRBC AUTOMATED: 0 PER 100 WBC
PDW BLD-RTO: 16.4 % (ref 11.8–14.4)
PLATELET # BLD: 406 K/UL (ref 138–453)
PMV BLD AUTO: 10.3 FL (ref 8.1–13.5)
RBC # BLD: 5.05 M/UL (ref 3.95–5.11)
RBC # BLD: ABNORMAL 10*6/UL
SEG NEUTROPHILS: 63 % (ref 34–64)
SEGMENTED NEUTROPHILS ABSOLUTE COUNT: 6.8 K/UL (ref 1.5–8.1)
WBC # BLD: 10.7 K/UL (ref 4.5–13.5)

## 2022-04-25 ENCOUNTER — TELEPHONE (OUTPATIENT)
Dept: ONCOLOGY | Age: 22
End: 2022-04-25

## 2022-04-25 ENCOUNTER — OFFICE VISIT (OUTPATIENT)
Dept: ONCOLOGY | Age: 22
End: 2022-04-25
Payer: COMMERCIAL

## 2022-04-25 VITALS
RESPIRATION RATE: 16 BRPM | SYSTOLIC BLOOD PRESSURE: 119 MMHG | HEART RATE: 83 BPM | TEMPERATURE: 97.9 F | BODY MASS INDEX: 49.38 KG/M2 | DIASTOLIC BLOOD PRESSURE: 77 MMHG | WEIGHT: 270 LBS

## 2022-04-25 DIAGNOSIS — N92.1 MENORRHAGIA WITH IRREGULAR CYCLE: Primary | ICD-10-CM

## 2022-04-25 DIAGNOSIS — D50.0 IRON DEFICIENCY ANEMIA DUE TO CHRONIC BLOOD LOSS: ICD-10-CM

## 2022-04-25 PROCEDURE — 99211 OFF/OP EST MAY X REQ PHY/QHP: CPT

## 2022-04-25 PROCEDURE — G8427 DOCREV CUR MEDS BY ELIG CLIN: HCPCS | Performed by: INTERNAL MEDICINE

## 2022-04-25 PROCEDURE — G8417 CALC BMI ABV UP PARAM F/U: HCPCS | Performed by: INTERNAL MEDICINE

## 2022-04-25 PROCEDURE — 99214 OFFICE O/P EST MOD 30 MIN: CPT | Performed by: INTERNAL MEDICINE

## 2022-04-25 PROCEDURE — 1036F TOBACCO NON-USER: CPT | Performed by: INTERNAL MEDICINE

## 2022-04-25 NOTE — TELEPHONE ENCOUNTER
JOHNY HERE FOR MD VISIT  RV 1 YR W/ CBC FERRITIN PRIOR  LABS CDP FERRITIN ORDERS GIVEN TO PT ON EXIT TO BE DONE 1 WK BEFORE RV 4/2023  MD VISIT April 2023  AVS PRINTED W/ INSTRUCTIONS AND GIVEN TO PT ON EXIT

## 2022-04-25 NOTE — PROGRESS NOTES
DIAGNOSIS:   1. Iron deficiency anemia  2. PCOS  3. Menometrorrhagia  CURRENT THERAPY:  Received blood transfusion and IV iron in the hospital  Oral iron  BRIEF CASE HISTORY:   Dashawn Grullon is a very pleasant 25 y.o. female who is referred to us for iron deficiency anemia. She has history of PCOS and menometrorrhagia, she presented with dizziness and lightheadedness. She was found to be severely anemic requiring transfusion. Patient was discharged from [facility] with an appointment to follow-up with us and with GI. She is sent to us for a consultation, she is feeling a little stronger. She is taking oral iron twice a day. She is going through a lot of stress due to family and social issues. She denies any active bleeding otherwise. She denies any PICA symptoms. INTERIM HISTORY: Patient comes in today for follow up for anemia. She has some persistent fatigue that feels may be sleep apnea and has plans to discuss with her PCP in 2 months. She is taking oral iron with no issues. Aside from her menstrual cycles she denies any bleeding. PAST MEDICAL HISTORY: has a past medical history of PCOS (polycystic ovarian syndrome). PAST SURGICAL HISTORY: has a past surgical history that includes Tympanostomy tube placement and [place] tooth extraction. CURRENT MEDICATIONS:  has a current medication list which includes the following prescription(s): norgestimate-ethinyl estradiol, prenatal vit-fe fumarate-fa, ferrous sulfate, vitamin c, and omeprazole. ALLERGIES:  has No Known Allergies. FAMILY HISTORY: Negative for any hematological or oncological conditions. SOCIAL HISTORY:  reports that she has never smoked. She has never used smokeless tobacco. She reports current alcohol use. She reports that she does not use drugs. REVIEW OF SYSTEMS:   General: no fever or night sweats, Weight is stable.   Fatigue   ENT: No double or blurred vision, no tinnitus or hearing problem, no dysphagia or sore throat   Respiratory: No chest pain, no shortness of breath, no cough or hemoptysis. Cardiovascular: Denies chest pain, PND or orthopnea. No L E swelling or palpitations. Gastrointestinal: No nausea or vomiting, abdominal pain, diarrhea or constipation. Genitourinary: Denies dysuria, hematuria, frequency, urgency or incontinence. Menometrorrhagia  Neurological: Denies headaches, decreased LOC, no sensory or motor focal deficits. Musculoskeletal:  No arthralgia no back pain or joint swelling. Skin: There are no rashes or bleeding. Psychiatric: No anxiety, no depression. Endocrine: No diabetes or thyroid disease. Hematologic: No bleeding , no adenopathy. PHYSICAL EXAM: Shows a well appearing 25y.o.-year-old female who is not in pain or distress. She is morbidly obese with signs of hirsutism likely related to PCOS vital Signs: Last menstrual period 04/25/2022, not currently breastfeeding. HEENT: Thyroid enlargement. Normocephalic and atraumatic. Pupils are equal, round, reactive to light and accommodation. Extraocular muscles are intact. Neck: Showed no JVD, no carotid bruit . Lungs: Clear to auscultation bilaterally. Heart: Regular without any murmur. Abdomen: Soft, nontender. No hepatosplenomegaly. Extremities: Lower extremities show no edema, clubbing, or cyanosis. Breasts: Examination not done today. Neuro exam: intact cranial nerves bilaterally no motor or sensory deficit, gait is normal. Lymphatic: no adenopathy appreciated in the supraclavicular, axillary, cervical or inguinal area    REVIEW OF LABORATORY DATA:   Lab Results   Component Value Date    WBC 10.7 04/19/2022    HGB 12.3 04/19/2022    HCT 42.1 04/19/2022    MCV 83.4 04/19/2022     04/19/2022     Lab Results   Component Value Date    IRON 25 (L) 10/15/2021    TIBC 285 10/15/2021    FERRITIN 27 04/19/2022       REVIEW OF RADIOLOGICAL RESULTS:     IMPRESSION:   1. Menometrorrhagia   2.  Iron deficiency anemia secondary to blood loss  3. Responding to IV iron and oral iron replacement      PLAN:   1. Her lab work shows recovery in her HGB and ferritin. 2. She is tolerating oral iron well and we will continue every other day. 3. We discussed her fatigue which she feels is sleep related, she plans to discuss with PCP. 4. Return in 1 year. Scribe Attestation   This note was created by Glenny Yip acting as scribe for the physician signing this note  Electronically Signed  Brianda Chen, 4/25/2022  Scribe, [employer]. Attending Attestation   Note was reviewed and edited.   I am in agreement with the note as entered    Erich Camacho MD  Hematologist/Medical [id number] [facility] hematology oncology physicians

## 2022-09-26 ENCOUNTER — HOSPITAL ENCOUNTER (OUTPATIENT)
Age: 22
Setting detail: SPECIMEN
Discharge: HOME | End: 2022-09-26

## 2022-09-26 ENCOUNTER — OFFICE VISIT (OUTPATIENT)
Dept: OBGYN CLINIC | Age: 22
End: 2022-09-26
Payer: COMMERCIAL

## 2022-09-26 VITALS
HEIGHT: 62 IN | DIASTOLIC BLOOD PRESSURE: 72 MMHG | HEART RATE: 91 BPM | SYSTOLIC BLOOD PRESSURE: 120 MMHG | WEIGHT: 279 LBS | BODY MASS INDEX: 51.34 KG/M2

## 2022-09-26 DIAGNOSIS — Z01.419 WELL WOMAN EXAM WITH ROUTINE GYNECOLOGICAL EXAM: Primary | ICD-10-CM

## 2022-09-26 PROCEDURE — 99395 PREV VISIT EST AGE 18-39: CPT | Performed by: OBSTETRICS & GYNECOLOGY

## 2022-09-26 ASSESSMENT — ENCOUNTER SYMPTOMS
COUGH: 0
ABDOMINAL PAIN: 0
SHORTNESS OF BREATH: 0
BACK PAIN: 0

## 2022-09-26 NOTE — PROGRESS NOTES
none  Psychosocial History: Occupation:   [employer]   Caffeine Yes    At risk for depression No    Abuse:   No  Seatbelt:   Yes  Exercise:  No    Social History     Socioeconomic History    Marital status: Single     Spouse name: Not on file    Number of children: Not on file    Years of education: Not on file    Highest education level: Not on file   Occupational History    Not on file   Tobacco Use    Smoking status: Never    Smokeless tobacco: Never   Vaping Use    Vaping Use: Never used   Substance and Sexual Activity    Alcohol use: Yes     Comment: rare    Drug use: Never    Sexual activity: Not on file   Other Topics Concern    Not on file   Social History Narrative    Not on file     Social Determinants of Health     Financial Resource Strain: Not on file   Food Insecurity: Not on file   Transportation Needs: Not on file   Physical Activity: Not on file   Stress: Not on file   Social Connections: Not on file   Intimate Partner Violence: Not on file   Housing Stability: Not on file       Family History   Problem Relation Age of Onset    Diabetes Father     Diabetes Brother     Heart Attack Paternal Grandmother     Heart Disease Paternal Grandfather     Heart Attack Paternal Grandfather        Review of Systems:   Review of Systems   Constitutional:  Negative for chills and fever. HENT:  Negative for congestion. Respiratory:  Negative for cough and shortness of breath. Cardiovascular:  Negative for chest pain and palpitations. Gastrointestinal:  Negative for abdominal pain. Genitourinary:  Negative for menstrual problem, pelvic pain and vaginal discharge. Musculoskeletal:  Negative for back pain. Neurological:  Negative for dizziness and light-headedness. Psychiatric/Behavioral:  The patient is not nervous/anxious.       Physical exam:  vitals:  Height   5  ft    2 in,  Weight    279 lbs,   120/72 BP  Gen: alert, no apparent distress  HEENT:No pathologic skin lesions noted,NC/AT,PERRL, normal midline nontender thyroid   Lung Exam: Clear to auscultation in all fields bilaterally, without wheezes,rales or rhonchi. Cardiac Exam: Normal sinus rhythm andrate, without murmurs, rubs or gallops appreciated. Breast Exam: Symmetric without pathological skin changes, nontender without discrete suspicious masses palpated, supraclavicular or axillary adenopathy or nipple discharge noted. Abdominal Exam: Nontender to deep palpation without organomegaly, masses or CVAT appreciated, BS positive. No spinal deformation or tenderness. External Genitalia: Normal development without vulvar,vaginal or cervical lesions noted. Normal vaginal discharge, uterus anterior, 4-6 weeks without CMT. Adnexa nontender without abnormal masses bilaterally. Rectal Exam: Omitted. Extremities: Nontender without clubbing, cyanosis or edema. F.R.O.M. Neurologic Exam: Grossly intact without noted sensorimotor deficits and oriented x 3. Assessment/Plan:   Unremarkable annual Gyn exam.    Cervical Cytology Evaluation begins at 24years old. If Negative Cytology, Follow-up screening per current guidelines. Mammograms every 1year. If 35 yo and last mammogram was negative. Hereditary Breast, Ovarian, Colon and Uterine Cancer screening done. Birth control and barrier recommendations discussed. STD counseling and prevention reviewed. Gardisil counseling completed for all patients 10-35 yo. Routine health maintenance per patients PCP.   Pt to follow up for annual exam in 1 year    Carlos Garvey MD  [address]

## 2022-10-07 LAB — CYTOLOGY REPORT: NORMAL

## 2022-10-11 LAB
HPV SAMPLE: NORMAL
HPV, GENOTYPE 16: NOT DETECTED
HPV, GENOTYPE 18: NOT DETECTED
HPV, HIGH RISK OTHER: NOT DETECTED
HPV, INTERPRETATION: NORMAL
SPECIMEN DESCRIPTION: NORMAL

## 2023-03-29 RX ORDER — NORGESTIMATE AND ETHINYL ESTRADIOL 0.25-0.035
1 KIT ORAL DAILY
Qty: 1 PACKET | Refills: 12 | Status: SHIPPED | OUTPATIENT
Start: 2023-03-29

## 2023-03-29 NOTE — TELEPHONE ENCOUNTER
3/29/2023    GYN pt  Last seen: 9/26/2022  Last annual: 9-26-22  Last refill: 3-15-22    Next appt: Visit date not found    Additional notes:  N/A

## 2023-05-01 DIAGNOSIS — D50.0 IRON DEFICIENCY ANEMIA DUE TO CHRONIC BLOOD LOSS: Primary | ICD-10-CM

## 2023-05-02 ENCOUNTER — HOSPITAL ENCOUNTER (OUTPATIENT)
Age: 23
Setting detail: SPECIMEN
Discharge: HOME | End: 2023-05-02

## 2023-05-02 DIAGNOSIS — D50.0 IRON DEFICIENCY ANEMIA DUE TO CHRONIC BLOOD LOSS: ICD-10-CM

## 2023-05-02 LAB — FERRITIN SERPL-MCNC: 29 NG/ML (ref 13–150)

## 2023-05-05 ENCOUNTER — OFFICE VISIT (OUTPATIENT)
Dept: ONCOLOGY | Age: 23
End: 2023-05-05
Payer: COMMERCIAL

## 2023-05-05 ENCOUNTER — TELEPHONE (OUTPATIENT)
Dept: ONCOLOGY | Age: 23
End: 2023-05-05

## 2023-05-05 VITALS
DIASTOLIC BLOOD PRESSURE: 64 MMHG | WEIGHT: 283.8 LBS | RESPIRATION RATE: 16 BRPM | SYSTOLIC BLOOD PRESSURE: 127 MMHG | HEART RATE: 100 BPM | BODY MASS INDEX: 51.91 KG/M2 | TEMPERATURE: 98 F

## 2023-05-05 DIAGNOSIS — N92.1 MENORRHAGIA WITH IRREGULAR CYCLE: Primary | ICD-10-CM

## 2023-05-05 DIAGNOSIS — D50.0 IRON DEFICIENCY ANEMIA DUE TO CHRONIC BLOOD LOSS: ICD-10-CM

## 2023-05-05 PROCEDURE — G8417 CALC BMI ABV UP PARAM F/U: HCPCS | Performed by: INTERNAL MEDICINE

## 2023-05-05 PROCEDURE — 99213 OFFICE O/P EST LOW 20 MIN: CPT | Performed by: INTERNAL MEDICINE

## 2023-05-05 PROCEDURE — 1036F TOBACCO NON-USER: CPT | Performed by: INTERNAL MEDICINE

## 2023-05-05 PROCEDURE — 99211 OFF/OP EST MAY X REQ PHY/QHP: CPT

## 2023-05-05 PROCEDURE — G8427 DOCREV CUR MEDS BY ELIG CLIN: HCPCS | Performed by: INTERNAL MEDICINE

## 2023-05-05 RX ORDER — FLUTICASONE PROPIONATE 50 MCG
SPRAY, SUSPENSION (ML) NASAL
COMMUNITY
Start: 2023-03-28

## 2023-05-05 NOTE — PROGRESS NOTES
DIAGNOSIS:   Iron deficiency anemia  PCOS  Menometrorrhagia  CURRENT THERAPY:  Received blood transfusion and IV iron in the hospital  Oral iron  BRIEF CASE HISTORY:   Johna Huffman is a very pleasant 26 y.o. female who is referred to us for iron deficiency anemia. She has history of PCOS and menometrorrhagia, she presented with dizziness and lightheadedness. She was found to be severely anemic requiring transfusion. Patient was discharged from [facility] with an appointment to follow-up with us and with GI. She is sent to us for a consultation, she is feeling a little stronger. She is taking oral iron twice a day. She is going through a lot of stress due to family and social issues. She denies any active bleeding otherwise. She denies any PICA symptoms. INTERIM HISTORY: Patient comes in today for follow up for anemia. Feeling okay without any symptoms. Her menstrual period's are much lighter. She is taking the iron every other day and tolerating that well. PAST MEDICAL HISTORY: has a past medical history of PCOS (polycystic ovarian syndrome). PAST SURGICAL HISTORY: has a past surgical history that includes Tympanostomy tube placement and [place] tooth extraction. CURRENT MEDICATIONS:  has a current medication list which includes the following prescription(s): fluticasone, norgestimate-ethinyl estradiol, prenatal vit-fe fumarate-fa, ferrous sulfate, vitamin c, and omeprazole. ALLERGIES:  has No Known Allergies. FAMILY HISTORY: Negative for any hematological or oncological conditions. SOCIAL HISTORY:  reports that she has never smoked. She has never used smokeless tobacco. She reports current alcohol use. She reports that she does not use drugs. REVIEW OF SYSTEMS:   General: no fever or night sweats, Weight is stable.   Fatigue   ENT: No double or blurred vision, no tinnitus or hearing problem, no dysphagia or sore throat   Respiratory: No chest pain, no shortness of kristy

## 2023-05-05 NOTE — TELEPHONE ENCOUNTER
JOHNY ARRIVES AMBULATORY FOR MD VISIT  DR Linden Rodriguez INTO SEE PATIENT  ORDERS RECEIVED  RV 1 YEAR WITH CBC & IRON STUDIES PRIOR TO RV  LABS CDP FE TIBC FERRITIN DUE 04/29/24, ORDERS GIVEN TO PT  MD VISIT 05/06/24 @11AM  AVS PRINTED AND GIVEN TO PATIENT WITH INSTRUCTIONS  PATIENT DISCHARGED AMBULATORY

## 2023-09-29 ENCOUNTER — OFFICE VISIT (OUTPATIENT)
Dept: OBGYN CLINIC | Age: 23
End: 2023-09-29

## 2023-09-29 VITALS
HEIGHT: 62 IN | WEIGHT: 277 LBS | DIASTOLIC BLOOD PRESSURE: 84 MMHG | SYSTOLIC BLOOD PRESSURE: 132 MMHG | BODY MASS INDEX: 50.97 KG/M2

## 2023-09-29 DIAGNOSIS — Z01.419 ENCOUNTER FOR GYNECOLOGICAL EXAMINATION: Primary | ICD-10-CM

## 2023-09-29 PROCEDURE — 99395 PREV VISIT EST AGE 18-39: CPT | Performed by: OBSTETRICS & GYNECOLOGY

## 2023-09-29 ASSESSMENT — ENCOUNTER SYMPTOMS
COUGH: 0
ABDOMINAL PAIN: 0
BACK PAIN: 0
SHORTNESS OF BREATH: 0

## 2023-09-29 NOTE — PROGRESS NOTES
Uterine Cancer screening done. Birth control and barrier recommendations discussed. STD counseling and prevention reviewed. Gardisil counseling completed for all patients 7-36 yo. Routine health maintenance per patients PCP.   Pt to follow up for annual exam in 1 year    Kiara Wiggins MD  [address] [facility]

## 2024-03-13 RX ORDER — NORGESTIMATE AND ETHINYL ESTRADIOL 0.25-0.035
1 KIT ORAL DAILY
Qty: 1 PACKET | Refills: 7 | Status: SHIPPED | OUTPATIENT
Start: 2024-03-13

## 2024-04-29 ENCOUNTER — HOSPITAL ENCOUNTER (OUTPATIENT)
Age: 24
Setting detail: SPECIMEN
Discharge: HOME | End: 2024-04-29

## 2024-04-29 DIAGNOSIS — N92.1 MENORRHAGIA WITH IRREGULAR CYCLE: ICD-10-CM

## 2024-04-29 DIAGNOSIS — D50.0 IRON DEFICIENCY ANEMIA DUE TO CHRONIC BLOOD LOSS: ICD-10-CM

## 2024-04-29 LAB
BASOPHILS # BLD: 0.06 K/UL (ref 0–0.2)
BASOPHILS NFR BLD: 1 % (ref 0–2)
EOSINOPHIL # BLD: 0.18 K/UL (ref 0–0.44)
EOSINOPHILS RELATIVE PERCENT: 2 % (ref 1–4)
ERYTHROCYTE [DISTWIDTH] IN BLOOD BY AUTOMATED COUNT: 15.9 % (ref 11.8–14.4)
HCT VFR BLD AUTO: 42 % (ref 36.3–47.1)
HGB BLD-MCNC: 12.1 G/DL (ref 11.9–15.1)
IMM GRANULOCYTES # BLD AUTO: 0.08 K/UL (ref 0–0.3)
IMM GRANULOCYTES NFR BLD: 1 %
IRON SATN MFR SERPL: 9 % (ref 20–55)
IRON SERPL-MCNC: 31 UG/DL (ref 37–145)
LYMPHOCYTES NFR BLD: 2.04 K/UL (ref 1.1–3.7)
LYMPHOCYTES RELATIVE PERCENT: 23 % (ref 24–43)
MCH RBC QN AUTO: 23.4 PG (ref 25.2–33.5)
MCHC RBC AUTO-ENTMCNC: 28.8 G/DL (ref 28.4–34.8)
MCV RBC AUTO: 81.4 FL (ref 82.6–102.9)
MONOCYTES NFR BLD: 0.95 K/UL (ref 0.1–1.2)
MONOCYTES NFR BLD: 11 % (ref 3–12)
NEUTROPHILS NFR BLD: 62 % (ref 36–65)
NEUTS SEG NFR BLD: 5.4 K/UL (ref 1.5–8.1)
NRBC BLD-RTO: 0 PER 100 WBC
PLATELET # BLD AUTO: 452 K/UL (ref 138–453)
PMV BLD AUTO: 10.1 FL (ref 8.1–13.5)
RBC # BLD AUTO: 5.16 M/UL (ref 3.95–5.11)
RBC # BLD: ABNORMAL 10*6/UL
TIBC SERPL-MCNC: 330 UG/DL (ref 250–450)
UNSATURATED IRON BINDING CAPACITY: 299 UG/DL (ref 112–347)
WBC OTHER # BLD: 8.7 K/UL (ref 3.5–11.3)

## 2024-09-29 NOTE — PROGRESS NOTES
[facility]  [address]  [facility] [address]  Dept: [phone]    Chief complaint:   Chief Complaint   Patient presents with    Gynecologic Exam       History Present Illness: Saleem is a 23 yo female who presents for her annual exam.  She did have some LLQ abdominal pain that happened in January and it lasted for a couple days.  Then it resolved and is not having pain at this time.  She says that heat helped to get rid of the pain.  She is doing well on her OCPs and that is helping to control her periods.  She did have a partner, but they never had vaginal penetration.  She has participated in oral sex, but not vaginal sex.  She denies any vaginal discharge or irritation.  She denies any pelvic pain now.  She denies any bowel or bladder issues.     Current Medications (OTC/Herbal):   Current Outpatient Medications   Medication Sig Dispense Refill    norgestimate-ethinyl estradiol (ORTHO-CYCLEN, 28,) 0.25-35 MG-MCG per tablet Take 1 tablet by mouth daily 1 packet 12    Prenatal Vit-Fe Fumarate-FA (PRENATAL VITAMINS PO) Take 1 tablet by mouth daily      ferrous sulfate (IRON 325) 325 (65 Fe) MG tablet Take 1 tablet by mouth 2 times daily OTC / taking one tablet every other day      Ascorbic Acid (VITAMIN C) 250 MG tablet Take 1 tablet by mouth daily      omeprazole (PRILOSEC) 40 MG delayed release capsule take 1 capsule by mouth once daily      fluticasone (FLONASE) 50 MCG/ACT nasal spray ADMINISTER 2 SPRAYS INTO EACH NOSTRIL IN THE MORNING. ANATOLY COLE...  (REFER TO PRESCRIPTION NOTES).       No current facility-administered medications for this visit.     Allergies: No Known Allergies  Past Medical History:   Past Medical History:   Diagnosis Date    Anemia 6-15-21    Somewhere around there not sure if i have normal levels but still takr my iron every other day    History of blood transfusion     Went to the hospital

## 2024-10-01 ENCOUNTER — OFFICE VISIT (OUTPATIENT)
Dept: OBGYN CLINIC | Age: 24
End: 2024-10-01

## 2024-10-01 VITALS
BODY MASS INDEX: 51.89 KG/M2 | HEART RATE: 112 BPM | SYSTOLIC BLOOD PRESSURE: 124 MMHG | DIASTOLIC BLOOD PRESSURE: 62 MMHG | WEIGHT: 282 LBS | HEIGHT: 62 IN

## 2024-10-01 DIAGNOSIS — Z01.419 ENCOUNTER FOR GYNECOLOGICAL EXAMINATION: Primary | ICD-10-CM

## 2024-10-01 PROCEDURE — 99395 PREV VISIT EST AGE 18-39: CPT | Performed by: OBSTETRICS & GYNECOLOGY

## 2024-10-01 PROCEDURE — 99459 PELVIC EXAMINATION: CPT | Performed by: OBSTETRICS & GYNECOLOGY

## 2024-10-01 RX ORDER — NORGESTIMATE AND ETHINYL ESTRADIOL 0.25-0.035
1 KIT ORAL DAILY
Qty: 1 PACKET | Refills: 12 | Status: SHIPPED | OUTPATIENT
Start: 2024-10-01

## 2024-10-01 ASSESSMENT — ENCOUNTER SYMPTOMS
COUGH: 0
SHORTNESS OF BREATH: 0
BACK PAIN: 0
ABDOMINAL PAIN: 0

## 2025-07-12 NOTE — PROGRESS NOTES
Smokeless tobacco: Never   Vaping Use    Vaping status: Never Used   Substance Use Topics    Alcohol use: Yes     Comment: rare    Drug use: Never           MEDICATIONS:  Current Outpatient Medications   Medication Sig Dispense Refill    norgestimate-ethinyl estradiol (ORTHO-CYCLEN, 28,) 0.25-35 MG-MCG per tablet Take 1 tablet by mouth daily 1 packet 12    Prenatal Vit-Fe Fumarate-FA (PRENATAL VITAMINS PO) Take 1 tablet by mouth daily      ferrous sulfate (IRON 325) 325 (65 Fe) MG tablet Take 1 tablet by mouth 2 times daily OTC / taking one tablet every other day      Ascorbic Acid (VITAMIN C) 250 MG tablet Take 1 tablet by mouth daily      omeprazole (PRILOSEC) 40 MG delayed release capsule take 1 capsule by mouth once daily       No current facility-administered medications for this visit.         ALLERGIES:  Allergies as of 07/15/2025    (No Known Allergies)         Vitals:    07/15/25 1136   BP: 117/69   BP Site: Left Upper Arm   Patient Position: Sitting   BP Cuff Size: Large Adult   Weight: 128.4 kg (283 lb)   Height: 1.575 m (5' 2\")       Pregnancy test results: not done, pt not sexually active ever      Lab:  Lab Results   Component Value Date/Time    CYTORPT  09/26/2022 07:55 AM     INTERPRETATION    Cervical material, (ThinPrep vial, Imaging-assisted review):  Specimen Adequacy:       Satisfactory for evaluation.       - Endocervical/transformation zone component present.  Descriptive Diagnosis:       Atypical squamous cells of undetermined significance (ASC-US).          Cytotechnologist:   TAMMY Kirkland M.D.  **Electronically Signed Out**         rdd/10/7/2022        Procedure/Addendum  HPV Procedure Report       Date Ordered:     10/7/2022     Status: Signed Out       Date Complete:     10/11/2022     By: System Interface       Date Reported:     10/11/2022              Sample:  HPV Type 16      Result:   Not Detected      Ref Range:  (Not Detected)  Sample:  HPV Type 18      Result:   Not

## 2025-07-15 ENCOUNTER — PROCEDURE VISIT (OUTPATIENT)
Dept: OBGYN CLINIC | Age: 25
End: 2025-07-15

## 2025-07-15 VITALS
HEIGHT: 62 IN | DIASTOLIC BLOOD PRESSURE: 69 MMHG | SYSTOLIC BLOOD PRESSURE: 117 MMHG | WEIGHT: 283 LBS | BODY MASS INDEX: 52.08 KG/M2

## 2025-07-15 DIAGNOSIS — N94.6 DYSMENORRHEA: ICD-10-CM

## 2025-07-15 DIAGNOSIS — E28.2 PCOS (POLYCYSTIC OVARIAN SYNDROME): ICD-10-CM

## 2025-07-15 DIAGNOSIS — Z30.430 ENCOUNTER FOR IUD INSERTION: Primary | ICD-10-CM

## 2025-07-15 PROCEDURE — 58300 INSERT INTRAUTERINE DEVICE: CPT | Performed by: OBSTETRICS & GYNECOLOGY

## 2025-08-28 ENCOUNTER — PROCEDURE VISIT (OUTPATIENT)
Dept: OBGYN CLINIC | Age: 25
End: 2025-08-28

## 2025-08-28 VITALS
BODY MASS INDEX: 52.6 KG/M2 | SYSTOLIC BLOOD PRESSURE: 126 MMHG | WEIGHT: 287.6 LBS | DIASTOLIC BLOOD PRESSURE: 75 MMHG | HEART RATE: 88 BPM

## 2025-08-28 DIAGNOSIS — F41.9 ANXIETY: ICD-10-CM

## 2025-08-28 DIAGNOSIS — E28.2 PCOS (POLYCYSTIC OVARIAN SYNDROME): Primary | ICD-10-CM

## 2025-08-28 PROCEDURE — 99213 OFFICE O/P EST LOW 20 MIN: CPT | Performed by: STUDENT IN AN ORGANIZED HEALTH CARE EDUCATION/TRAINING PROGRAM

## 2025-08-28 RX ORDER — BUSPIRONE HYDROCHLORIDE 10 MG/1
10 TABLET ORAL 3 TIMES DAILY
Qty: 90 TABLET | Refills: 6 | Status: SHIPPED | OUTPATIENT
Start: 2025-08-28 | End: 2026-03-26